# Patient Record
Sex: MALE | Race: WHITE | NOT HISPANIC OR LATINO
[De-identification: names, ages, dates, MRNs, and addresses within clinical notes are randomized per-mention and may not be internally consistent; named-entity substitution may affect disease eponyms.]

---

## 2018-12-04 PROBLEM — Z00.00 ENCOUNTER FOR PREVENTIVE HEALTH EXAMINATION: Status: ACTIVE | Noted: 2018-12-04

## 2019-02-04 ENCOUNTER — RECORD ABSTRACTING (OUTPATIENT)
Age: 64
End: 2019-02-04

## 2019-02-04 DIAGNOSIS — Z78.9 OTHER SPECIFIED HEALTH STATUS: ICD-10-CM

## 2019-02-04 DIAGNOSIS — G47.30 SLEEP APNEA, UNSPECIFIED: ICD-10-CM

## 2019-02-04 DIAGNOSIS — K21.9 GASTRO-ESOPHAGEAL REFLUX DISEASE W/OUT ESOPHAGITIS: ICD-10-CM

## 2019-02-06 ENCOUNTER — APPOINTMENT (OUTPATIENT)
Dept: INTERNAL MEDICINE | Facility: CLINIC | Age: 64
End: 2019-02-06
Payer: COMMERCIAL

## 2019-02-06 VITALS
HEART RATE: 72 BPM | OXYGEN SATURATION: 95 % | BODY MASS INDEX: 43.55 KG/M2 | DIASTOLIC BLOOD PRESSURE: 70 MMHG | HEIGHT: 66 IN | WEIGHT: 271 LBS | SYSTOLIC BLOOD PRESSURE: 130 MMHG | TEMPERATURE: 98 F

## 2019-02-06 PROCEDURE — 99204 OFFICE O/P NEW MOD 45 MIN: CPT

## 2019-02-06 NOTE — PHYSICAL EXAM
[No Acute Distress] : no acute distress [Well Developed] : well developed [Well-Appearing] : well-appearing [Normal Sclera/Conjunctiva] : normal sclera/conjunctiva [PERRL] : pupils equal round and reactive to light [EOMI] : extraocular movements intact [Normal Outer Ear/Nose] : the outer ears and nose were normal in appearance [No JVD] : no jugular venous distention [Supple] : supple [No Lymphadenopathy] : no lymphadenopathy [Thyroid Normal, No Nodules] : the thyroid was normal and there were no nodules present [No Respiratory Distress] : no respiratory distress  [Clear to Auscultation] : lungs were clear to auscultation bilaterally [No Accessory Muscle Use] : no accessory muscle use [Normal Rate] : normal rate  [Regular Rhythm] : with a regular rhythm [Normal S1, S2] : normal S1 and S2 [No Murmur] : no murmur heard [No Carotid Bruits] : no carotid bruits [No Abdominal Bruit] : a ~M bruit was not heard ~T in the abdomen [No Varicosities] : no varicosities [Pedal Pulses Present] : the pedal pulses are present [No Edema] : there was no peripheral edema [No Extremity Clubbing/Cyanosis] : no extremity clubbing/cyanosis [No Palpable Aorta] : no palpable aorta [Soft] : abdomen soft [Non Tender] : non-tender [Non-distended] : non-distended [No Masses] : no abdominal mass palpated [No HSM] : no HSM [Normal Bowel Sounds] : normal bowel sounds [Normal Posterior Cervical Nodes] : no posterior cervical lymphadenopathy [Normal Anterior Cervical Nodes] : no anterior cervical lymphadenopathy [No CVA Tenderness] : no CVA  tenderness [No Spinal Tenderness] : no spinal tenderness [No Joint Swelling] : no joint swelling [Grossly Normal Strength/Tone] : grossly normal strength/tone [No Rash] : no rash [Normal Gait] : normal gait [Coordination Grossly Intact] : coordination grossly intact [No Focal Deficits] : no focal deficits [Normal Affect] : the affect was normal [Normal Insight/Judgement] : insight and judgment were intact [de-identified] : morbid obesity [de-identified] : mallampati 3

## 2019-02-06 NOTE — PLAN
[FreeTextEntry1] : Arrange for an auto Pap unit. Patient is to return between one month and 3 months after starting therapy.

## 2019-02-06 NOTE — HISTORY OF PRESENT ILLNESS
[FreeTextEntry1] : Evaluation for sleep apnea. [de-identified] : This is a 64-year-old white male with a history of loud snoring and witnessed apneas. He recently saw his cardiologist and was referred for an outpatient sleep study. This reveals severe obstructive sleep apnea syndrome with a age high at 53.4 with severe O2 desaturations down to 61%. The patient works nights and sleeps mostly from 12 noon to 4 PM. He occasionally is able to take a nap at work from 4 AM to 6 AM. On the weekends he sleeps 8-9 hours per night. He denies excess daytime sleepiness with an [place] score of 5. 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 He is 5 feet 6 weight 271 pounds. There is a past history of of hyperlipidemia and borderline diabetes. The patient is unaware of any history of ASHD. His medications include Pravachol, Wellbutrin and baby aspirin. He is a nonsmoker. He occasionally has a drink.

## 2019-02-06 NOTE — ASSESSMENT
[FreeTextEntry1] : Patient with severe obstructive sleep apnea syndrome with an AHI of 53.4 with severe O2 desaturations. Will arrange for auto PAP. Patient is advised regarding the need to lose weight.

## 2019-02-13 ENCOUNTER — APPOINTMENT (OUTPATIENT)
Dept: CARDIOLOGY | Facility: CLINIC | Age: 64
End: 2019-02-13

## 2019-05-22 ENCOUNTER — APPOINTMENT (OUTPATIENT)
Dept: INTERNAL MEDICINE | Facility: CLINIC | Age: 64
End: 2019-05-22
Payer: COMMERCIAL

## 2019-05-22 VITALS
BODY MASS INDEX: 43.55 KG/M2 | OXYGEN SATURATION: 98 % | HEART RATE: 65 BPM | WEIGHT: 271 LBS | HEIGHT: 66 IN | SYSTOLIC BLOOD PRESSURE: 130 MMHG | DIASTOLIC BLOOD PRESSURE: 80 MMHG

## 2019-05-22 PROCEDURE — 99213 OFFICE O/P EST LOW 20 MIN: CPT

## 2019-05-22 NOTE — PHYSICAL EXAM
[No Acute Distress] : no acute distress [Well Developed] : well developed [Well-Appearing] : well-appearing [Normal Sclera/Conjunctiva] : normal sclera/conjunctiva [PERRL] : pupils equal round and reactive to light [EOMI] : extraocular movements intact [Normal Outer Ear/Nose] : the outer ears and nose were normal in appearance [No JVD] : no jugular venous distention [Supple] : supple [No Lymphadenopathy] : no lymphadenopathy [Thyroid Normal, No Nodules] : the thyroid was normal and there were no nodules present [No Respiratory Distress] : no respiratory distress  [Clear to Auscultation] : lungs were clear to auscultation bilaterally [No Accessory Muscle Use] : no accessory muscle use [Normal Rate] : normal rate  [Regular Rhythm] : with a regular rhythm [Normal S1, S2] : normal S1 and S2 [No Murmur] : no murmur heard [No Carotid Bruits] : no carotid bruits [No Abdominal Bruit] : a ~M bruit was not heard ~T in the abdomen [No Varicosities] : no varicosities [Pedal Pulses Present] : the pedal pulses are present [No Edema] : there was no peripheral edema [No Extremity Clubbing/Cyanosis] : no extremity clubbing/cyanosis [No Palpable Aorta] : no palpable aorta [Soft] : abdomen soft [Non Tender] : non-tender [Non-distended] : non-distended [No Masses] : no abdominal mass palpated [No HSM] : no HSM [Normal Bowel Sounds] : normal bowel sounds [Normal Posterior Cervical Nodes] : no posterior cervical lymphadenopathy [Normal Anterior Cervical Nodes] : no anterior cervical lymphadenopathy [No CVA Tenderness] : no CVA  tenderness [No Spinal Tenderness] : no spinal tenderness [No Joint Swelling] : no joint swelling [Grossly Normal Strength/Tone] : grossly normal strength/tone [No Rash] : no rash [Normal Gait] : normal gait [Coordination Grossly Intact] : coordination grossly intact [No Focal Deficits] : no focal deficits [Normal Affect] : the affect was normal [Normal Insight/Judgement] : insight and judgment were intact [de-identified] : morbid obesity [de-identified] : mallampati 3

## 2019-05-22 NOTE — ASSESSMENT
[FreeTextEntry1] : Patient is compliant and benefiting from the use of CPAP. Patient is to continue with CPAP. I have reviewed the need to lose weight with patient. Patient will return to see me in one year.

## 2019-05-22 NOTE — HISTORY OF PRESENT ILLNESS
[FreeTextEntry1] : Followup obstructive sleep apnea syndrome. [de-identified] : Patient has been on CPAP for about 6 weeks. He is using CPAP regularly. He works nights and generally sleeps from 12 PM to 5 PM. He is using nasal pillows. He uses a CPAP all day every day. He generally feels rested. He has no real issues with CPAP. His compliance report was reviewed dated 3-22-4-20. The AHI is 1.2. His average usage was 5 hours and 47 minutes per day. Patient has not lost weight.

## 2019-12-05 ENCOUNTER — RX RENEWAL (OUTPATIENT)
Age: 64
End: 2019-12-05

## 2019-12-06 ENCOUNTER — RX RENEWAL (OUTPATIENT)
Age: 64
End: 2019-12-06

## 2020-05-19 ENCOUNTER — APPOINTMENT (OUTPATIENT)
Dept: INTERNAL MEDICINE | Facility: CLINIC | Age: 65
End: 2020-05-19
Payer: COMMERCIAL

## 2020-05-19 VITALS
DIASTOLIC BLOOD PRESSURE: 90 MMHG | SYSTOLIC BLOOD PRESSURE: 160 MMHG | HEART RATE: 70 BPM | BODY MASS INDEX: 41.78 KG/M2 | WEIGHT: 260 LBS | HEIGHT: 66 IN | OXYGEN SATURATION: 98 %

## 2020-05-19 PROCEDURE — 99213 OFFICE O/P EST LOW 20 MIN: CPT

## 2020-05-19 NOTE — PHYSICAL EXAM
[No Acute Distress] : no acute distress [Well Developed] : well developed [Well Nourished] : well nourished [Well-Appearing] : well-appearing [Normal Sclera/Conjunctiva] : normal sclera/conjunctiva [EOMI] : extraocular movements intact [Normal Outer Ear/Nose] : the outer ears and nose were normal in appearance [PERRL] : pupils equal round and reactive to light [No JVD] : no jugular venous distention [No Lymphadenopathy] : no lymphadenopathy [Supple] : supple [Thyroid Normal, No Nodules] : the thyroid was normal and there were no nodules present [No Respiratory Distress] : no respiratory distress  [No Accessory Muscle Use] : no accessory muscle use [Normal Rate] : normal rate  [Clear to Auscultation] : lungs were clear to auscultation bilaterally [Regular Rhythm] : with a regular rhythm [Normal S1, S2] : normal S1 and S2 [No Murmur] : no murmur heard [No Carotid Bruits] : no carotid bruits [No Varicosities] : no varicosities [No Abdominal Bruit] : a ~M bruit was not heard ~T in the abdomen [No Edema] : there was no peripheral edema [Pedal Pulses Present] : the pedal pulses are present [No Palpable Aorta] : no palpable aorta [Soft] : abdomen soft [No Extremity Clubbing/Cyanosis] : no extremity clubbing/cyanosis [Non Tender] : non-tender [Non-distended] : non-distended [No Masses] : no abdominal mass palpated [No HSM] : no HSM [Normal Bowel Sounds] : normal bowel sounds [Normal Posterior Cervical Nodes] : no posterior cervical lymphadenopathy [Normal Anterior Cervical Nodes] : no anterior cervical lymphadenopathy [No Joint Swelling] : no joint swelling [No CVA Tenderness] : no CVA  tenderness [No Spinal Tenderness] : no spinal tenderness [Grossly Normal Strength/Tone] : grossly normal strength/tone [No Rash] : no rash [No Focal Deficits] : no focal deficits [Coordination Grossly Intact] : coordination grossly intact [Normal Affect] : the affect was normal [Normal Gait] : normal gait [Normal Insight/Judgement] : insight and judgment were intact [de-identified] : mallampati 3

## 2020-05-19 NOTE — ASSESSMENT
[FreeTextEntry1] : Patient with severe sleep apnea. Patient is compliant and benefiting from the use of CPAP. However he'll only sleeps for about 3-1/2 hours during the day when he is working. He is advised to try to sleep a lot during the day if possible given his work schedule. He will follow up with me in one year.

## 2020-05-19 NOTE — HISTORY OF PRESENT ILLNESS
[FreeTextEntry1] : One-year followup for sleep apnea. [de-identified] : Patient states he works during the night about 5:30 PM to 10 AM. He will nap for 2 hours during the night from 4 to 6 AM. When he goes home he will sleep from noon to 3:30. He states he uses his CPAP every day when he does sleep. He sleeps well with CPAP. He uses CPAP for 8 hours on the weekends. He has not lost weight and his weight amino acid 260. He denies other chronic complaints. He states he generally feels rested and is not excessively sleepy. His CPAP compliance report was reviewed. He uses CPAP and average of 3 hours and 53 minutes with an AHI of 1.3.

## 2020-06-23 ENCOUNTER — APPOINTMENT (OUTPATIENT)
Dept: CARDIOLOGY | Facility: CLINIC | Age: 65
End: 2020-06-23
Payer: COMMERCIAL

## 2020-06-23 ENCOUNTER — NON-APPOINTMENT (OUTPATIENT)
Age: 65
End: 2020-06-23

## 2020-06-23 VITALS — SYSTOLIC BLOOD PRESSURE: 125 MMHG | DIASTOLIC BLOOD PRESSURE: 65 MMHG | HEART RATE: 68 BPM | HEIGHT: 66 IN

## 2020-06-23 DIAGNOSIS — R94.39 ABNORMAL RESULT OF OTHER CARDIOVASCULAR FUNCTION STUDY: ICD-10-CM

## 2020-06-23 DIAGNOSIS — Z82.49 FAMILY HISTORY OF ISCHEMIC HEART DISEASE AND OTHER DISEASES OF THE CIRCULATORY SYSTEM: ICD-10-CM

## 2020-06-23 DIAGNOSIS — Z87.898 PERSONAL HISTORY OF OTHER SPECIFIED CONDITIONS: ICD-10-CM

## 2020-06-23 PROCEDURE — 93000 ELECTROCARDIOGRAM COMPLETE: CPT

## 2020-06-23 PROCEDURE — 99213 OFFICE O/P EST LOW 20 MIN: CPT

## 2020-06-23 NOTE — HISTORY OF PRESENT ILLNESS
[FreeTextEntry1] : Patient has made good progress since his last visit he has lost about  20 pounds by following an improved diet. He does walking exercise. There has been no chest discomfort, unusual shortness of breath or palpitations.

## 2020-06-23 NOTE — REASON FOR VISIT
[FreeTextEntry1] : The patient is followed with a diagnosis of nonobstructive coronary artery disease at catheterization in 2014, obesity, sleep apnea, hyperlipidemia.

## 2020-06-23 NOTE — PHYSICAL EXAM
[General Appearance - Well Developed] : well developed [Normal Appearance] : normal appearance [Well Groomed] : well groomed [General Appearance - Well Nourished] : well nourished [No Deformities] : no deformities [General Appearance - In No Acute Distress] : no acute distress [Normal Oral Mucosa] : normal oral mucosa [Eyelids - No Xanthelasma] : the eyelids demonstrated no xanthelasmas [Normal Conjunctiva] : the conjunctiva exhibited no abnormalities [No Oral Pallor] : no oral pallor [Normal Jugular Venous A Waves Present] : normal jugular venous A waves present [No Oral Cyanosis] : no oral cyanosis [Normal Jugular Venous V Waves Present] : normal jugular venous V waves present [No Jugular Venous Avelar A Waves] : no jugular venous avelar A waves [Auscultation Breath Sounds / Voice Sounds] : lungs were clear to auscultation bilaterally [Exaggerated Use Of Accessory Muscles For Inspiration] : no accessory muscle use [Respiration, Rhythm And Depth] : normal respiratory rhythm and effort [Heart Rate And Rhythm] : heart rate and rhythm were normal [Heart Sounds] : normal S1 and S2 [Murmurs] : no murmurs present [Abdomen Soft] : soft [Abdomen Tenderness] : non-tender [Abdomen Mass (___ Cm)] : no abdominal mass palpated [Abnormal Walk] : normal gait [Gait - Sufficient For Exercise Testing] : the gait was sufficient for exercise testing [Nail Clubbing] : no clubbing of the fingernails [Cyanosis, Localized] : no localized cyanosis [Petechial Hemorrhages (___cm)] : no petechial hemorrhages [Skin Color & Pigmentation] : normal skin color and pigmentation [] : no rash [No Venous Stasis] : no venous stasis [Skin Lesions] : no skin lesions [No Xanthoma] : no  xanthoma was observed [No Skin Ulcers] : no skin ulcer [Oriented To Time, Place, And Person] : oriented to person, place, and time [Affect] : the affect was normal [Mood] : the mood was normal [No Anxiety] : not feeling anxious

## 2020-06-23 NOTE — DISCUSSION/SUMMARY
[FreeTextEntry1] : Please with the patient's significant weight reduction over the past several months by following a better diet. He has been tried on various statins but probably statin is the only one that he has been able to tolerate this will be continued. Today's blood pressure was 125/65 the electrocardiogram reveals no significant change from previous. I am encouraging the patient to continue his program of weight reduction. I plan to do treadmill testing in 6 months.

## 2020-11-18 ENCOUNTER — RX RENEWAL (OUTPATIENT)
Age: 65
End: 2020-11-18

## 2020-12-08 ENCOUNTER — APPOINTMENT (OUTPATIENT)
Dept: CARDIOLOGY | Facility: CLINIC | Age: 65
End: 2020-12-08
Payer: COMMERCIAL

## 2020-12-08 VITALS
SYSTOLIC BLOOD PRESSURE: 140 MMHG | DIASTOLIC BLOOD PRESSURE: 75 MMHG | WEIGHT: 277 LBS | BODY MASS INDEX: 44.52 KG/M2 | HEIGHT: 66 IN | HEART RATE: 71 BPM

## 2020-12-08 PROCEDURE — 99072 ADDL SUPL MATRL&STAF TM PHE: CPT

## 2020-12-08 PROCEDURE — 93015 CV STRESS TEST SUPVJ I&R: CPT

## 2020-12-08 PROCEDURE — 99214 OFFICE O/P EST MOD 30 MIN: CPT | Mod: 25

## 2020-12-08 NOTE — DISCUSSION/SUMMARY
[FreeTextEntry1] : The patient's examination is significant for very severe obesity. Unfortunately there has been a 17 pound weight gain from May of this year.The patient has had no acute cardiac symptoms. His stress test reveals diffuse ST segment depression during exercise.the findings are similar to a stress test that I had performed in September 2018 after which I sent the patient for coronary angiography which revealed only nonobstructive coronary disease. I believe it is necessary to reevaluate the coronary status with another nuclear stress test at the present time. Going forward I am again recommending exercise weight reduction and dietary modifications etc. to try to limit the risk ofsubsequent coronary event.The patient is able to tolerate PRAVAstatin; he was not able to tolerate any of the other statins and therefore this will be continued along with his other medications. I am electing to losartan 25 mg to his therapeutic regimen for blood pressure control and vascular protection. The patient's labs are done by his primary care physician and I am asking the patient to request that they be sent.

## 2020-12-08 NOTE — REASON FOR VISIT
[FreeTextEntry1] : Patient comes for followup and stress testing. There is a history of severe obesity, sleep apnea, and hyperlipidemia. Patient has had no symptoms of chest pain or discomfort. He remains active he works regularly and also states that he does walking exercise without difficulty.

## 2020-12-08 NOTE — PHYSICAL EXAM

## 2021-03-16 ENCOUNTER — NON-APPOINTMENT (OUTPATIENT)
Age: 66
End: 2021-03-16

## 2021-03-16 ENCOUNTER — APPOINTMENT (OUTPATIENT)
Dept: CARDIOLOGY | Facility: CLINIC | Age: 66
End: 2021-03-16
Payer: COMMERCIAL

## 2021-03-16 VITALS
BODY MASS INDEX: 45.16 KG/M2 | WEIGHT: 281 LBS | HEART RATE: 75 BPM | TEMPERATURE: 98.6 F | DIASTOLIC BLOOD PRESSURE: 70 MMHG | HEIGHT: 66 IN | SYSTOLIC BLOOD PRESSURE: 148 MMHG | OXYGEN SATURATION: 99 %

## 2021-03-16 PROCEDURE — 99214 OFFICE O/P EST MOD 30 MIN: CPT

## 2021-03-16 PROCEDURE — 93000 ELECTROCARDIOGRAM COMPLETE: CPT

## 2021-03-16 PROCEDURE — 99072 ADDL SUPL MATRL&STAF TM PHE: CPT

## 2021-03-17 NOTE — HISTORY OF PRESENT ILLNESS
[FreeTextEntry1] : The patient has had no symptoms of angina, chest pain, palpitations or unusual shortness of breath the

## 2021-03-17 NOTE — REASON FOR VISIT
[FreeTextEntry1] : The patient is followed with a diagnosis of coronary artery disease, abnormal stress test, obesity, hyperlipidemia, hypertension. Several years ago I referred him for coronary angiography after an abnormal stress test this revealed nonobstructive coronary disease. More recently another stress test was abnormal with diffuse ST depression and I recommended a nuclear stress test however this was denied by his insurance company. It should be noted that the patient has not experienced chest pain or discomfort even during the stress test itself. Unfortunately weight gain persists: the patient has gained over 20 pounds in the last year. Regular exercise is limited. The patient is tolerating his medications without difficulty. He has been able to tolerate pravastatin but was not able to tolerate other statins. Also losartan was added to his therapeutic regimen.

## 2021-03-17 NOTE — PHYSICAL EXAM

## 2021-03-17 NOTE — DISCUSSION/SUMMARY
[FreeTextEntry1] : The patient requires intensive risk factor modification in order to prevent subsequent coronary events. I have explained to him that the nonobstructive disease present on angiography  in 2018 could develop into a clinical issue should there be plaque disruption. He has an extremely stressful work schedule requiring very long hours etc. I have asked him to try to modify this is if  at all possible. Very concerned about the patient's weight. I am urging him to adopt a low carbohydrate diet for weight reduction. I believe the best way of avoiding a subsequent coronary event will be lifestyle modification rather than multiple additional tests. We'll continue to follow the patient on a regular basis. He is also followed by his primary physician Dr. Escalera.\par The patient was given a prescription for repeat labs including a lipid profile. He might be a candidate for Zetia in addition to Pravachol. He is working

## 2021-04-19 ENCOUNTER — NON-APPOINTMENT (OUTPATIENT)
Age: 66
End: 2021-04-19

## 2021-04-22 RX ORDER — ALIROCUMAB 75 MG/ML
75 INJECTION, SOLUTION SUBCUTANEOUS
Qty: 6 | Refills: 3 | Status: DISCONTINUED | COMMUNITY
Start: 2021-04-20 | End: 2021-04-22

## 2021-05-18 ENCOUNTER — APPOINTMENT (OUTPATIENT)
Dept: FAMILY MEDICINE | Facility: CLINIC | Age: 66
End: 2021-05-18

## 2021-05-18 ENCOUNTER — APPOINTMENT (OUTPATIENT)
Dept: INTERNAL MEDICINE | Facility: CLINIC | Age: 66
End: 2021-05-18

## 2021-05-18 VITALS
HEART RATE: 76 BPM | DIASTOLIC BLOOD PRESSURE: 80 MMHG | WEIGHT: 287 LBS | SYSTOLIC BLOOD PRESSURE: 150 MMHG | OXYGEN SATURATION: 98 % | HEIGHT: 66 IN | BODY MASS INDEX: 46.12 KG/M2

## 2021-05-18 NOTE — HEALTH RISK ASSESSMENT
[Good] : ~his/her~  mood as  good [Yes] : Yes [2 - 4 times a month (2 pts)] : 2-4 times a month (2 points) [1 or 2 (0 pts)] : 1 or 2 (0 points) [Never (0 pts)] : Never (0 points) [No] : In the past 12 months have you used drugs other than those required for medical reasons? No [No falls in past year] : Patient reported no falls in the past year [0] : 2) Feeling down, depressed, or hopeless: Not at all (0) [Patient reported colonoscopy was abnormal] : Patient reported colonoscopy was abnormal [HIV test declined] : HIV test declined [Hepatitis C test declined] : Hepatitis C test declined [None] : None [With Significant Other] : lives with significant other [Employed] : employed [] :  [# Of Children ___] : has [unfilled] children [Feels Safe at Home] : Feels safe at home [Fully functional (bathing, dressing, toileting, transferring, walking, feeding)] : Fully functional (bathing, dressing, toileting, transferring, walking, feeding) [Fully functional (using the telephone, shopping, preparing meals, housekeeping, doing laundry, using] : Fully functional and needs no help or supervision to perform IADLs (using the telephone, shopping, preparing meals, housekeeping, doing laundry, using transportation, managing medications and managing finances) [Reports normal functional visual acuity (ie: able to read med bottle)] : Reports normal functional visual acuity [Smoke Detector] : smoke detector [Carbon Monoxide Detector] : carbon monoxide detector [Safety elements used in home] : safety elements used in home [Seat Belt] :  uses seat belt [Sunscreen] : uses sunscreen [] : No [de-identified] : none [de-identified] : normal [Change in mental status noted] : No change in mental status noted [Language] : denies difficulty with language [Behavior] : denies difficulty with behavior [Learning/Retaining New Information] : denies difficulty learning/retaining new information [Handling Complex Tasks] : denies difficulty handling complex tasks [Reasoning] : denies difficulty with reasoning [Spatial Ability and Orientation] : denies difficulty with spatial ability and orientation [Reports changes in hearing] : Reports no changes in hearing [Reports changes in vision] : Reports no changes in vision [Reports changes in dental health] : Reports no changes in dental health [Guns at Home] : no guns at home [Travel to Developing Areas] : does not  travel to developing areas [TB Exposure] : is not being exposed to tuberculosis [Caregiver Concerns] : does not have caregiver concerns [ColonoscopyDate] : 01/15 [ColonoscopyComments] : polyp

## 2021-06-15 ENCOUNTER — APPOINTMENT (OUTPATIENT)
Dept: PULMONOLOGY | Facility: HOSPITAL | Age: 66
End: 2021-06-15
Payer: COMMERCIAL

## 2021-06-15 VITALS
BODY MASS INDEX: 46.12 KG/M2 | HEART RATE: 76 BPM | SYSTOLIC BLOOD PRESSURE: 150 MMHG | HEIGHT: 66 IN | WEIGHT: 287 LBS | DIASTOLIC BLOOD PRESSURE: 80 MMHG

## 2021-06-15 PROCEDURE — 99203 OFFICE O/P NEW LOW 30 MIN: CPT

## 2021-06-15 RX ORDER — EZETIMIBE 10 MG/1
10 TABLET ORAL DAILY
Qty: 30 | Refills: 5 | Status: DISCONTINUED | COMMUNITY
Start: 2021-04-22 | End: 2021-06-15

## 2021-06-15 RX ORDER — FUROSEMIDE 80 MG/1
TABLET ORAL
Refills: 0 | Status: ACTIVE | COMMUNITY

## 2021-06-15 NOTE — HISTORY OF PRESENT ILLNESS
[FreeTextEntry1] : Dr. Escalera\par Dr. Gil\par 66 year old man  with history of pineda, cad, htn, hld is here in the sleep center to address  sleep apnea. Patient has severe apnea in 2019 and is on cpap since.\par download data\par ahi 1\par usage 5 hrs\par pressure 10 cm\par He is compliant and benefitted with cpap. He is not sleepy while driving.\par dme apria\par uses nasal pillows

## 2021-06-15 NOTE — ASSESSMENT
[FreeTextEntry1] : 66 year  old man  with sleep apnea is doing well with the CPAP.  Patient is compliant with the CPAP and benefited significantly with the CPAP.\par Patient has bilateral leg swelling so it could be possible that he is getting the swelling is due to his weight and being on the feet all day.  I do not see an echocardiogram in the chart, he will discuss with the cardiologist next week when he sees him to talk about possibility of a echocardiogram to see if there is any pulmonary hypertension or other etiologies for the leg swellings.

## 2021-06-15 NOTE — PHYSICAL EXAM
[General Appearance - Well Developed] : well developed [General Appearance - Well Nourished] : well nourished [IV] : IV [Heart Sounds] : normal S1 and S2 [Murmurs] : no murmurs [] : no respiratory distress [Auscultation Breath Sounds / Voice Sounds] : lungs were clear to auscultation bilaterally [No Focal Deficits] : no focal deficits [Oriented To Time, Place, And Person] : oriented to person, place, and time [Memory Recent] : recent memory was not impaired [FreeTextEntry1] : edema pitting

## 2021-07-20 ENCOUNTER — NON-APPOINTMENT (OUTPATIENT)
Age: 66
End: 2021-07-20

## 2021-07-20 ENCOUNTER — APPOINTMENT (OUTPATIENT)
Dept: CARDIOLOGY | Facility: CLINIC | Age: 66
End: 2021-07-20
Payer: COMMERCIAL

## 2021-07-20 VITALS
WEIGHT: 288 LBS | BODY MASS INDEX: 46.28 KG/M2 | DIASTOLIC BLOOD PRESSURE: 70 MMHG | TEMPERATURE: 98.7 F | HEART RATE: 70 BPM | HEIGHT: 66 IN | SYSTOLIC BLOOD PRESSURE: 120 MMHG

## 2021-07-20 PROCEDURE — 93000 ELECTROCARDIOGRAM COMPLETE: CPT

## 2021-07-20 PROCEDURE — 99214 OFFICE O/P EST MOD 30 MIN: CPT

## 2021-07-20 PROCEDURE — 99072 ADDL SUPL MATRL&STAF TM PHE: CPT

## 2021-07-20 NOTE — REASON FOR VISIT
[FreeTextEntry1] : The patient comes for followup today. I was concerned about a recent stress test which revealed significant ST segment depression but nochest pain.Ireferred the patient for nuclear stress test which was denied by his insurance company.Since his last visit the patient has not experienced chest pain or anginal symptoms. He remains in a stressful job situation with a disrupted sleep cycle. The patient is treated for sleep apnea with a CPAP machine which was recently adjusted by a specialist and not disorder.\france Bhagat weeks ago the patient noted discomfort in the lower extremities consisting of intermittent sharp pain down the posterior aspect of the thighs and calves. This has occurred sporadically. He thought it might be related to his mind that had recently been started on this medication was discontinued with no change in his symptoms.

## 2021-07-20 NOTE — REVIEW OF SYSTEMS
[Negative] : Heme/Lymph [FreeTextEntry9] : posterior lower extremity pain bilaterallysometimes aggravated by walking.

## 2021-07-20 NOTE — DISCUSSION/SUMMARY
[FreeTextEntry1] : I believe the patient's lower extremity symptoms are most likely not related to medication but rather are suggestive of spinal stenosis. The patient's examination reveals normal pedal pulses bilaterally arguing against a vascular etiology. The patient is asymptomatic regarding symptoms of angina or exertional chest discomfort. A previous coronary angiography revealed nonobstructive coronary artery disease. It is imperative that we practice of very aggressive preventive measures in this case. Consequently I am going to recommend a PCS canine inhibitor to try to lower the LDL cholesterol. He will continue on pravastatin which is the only statin that he has been able to tolerate. I have asked the patient to contact his primary care physician to evaluate the lower extremity symptoms possibly the patient will require an imaging study of the spine to evaluate the possibility of spinal stenosis. His blood pressure is now under better control with the addition of losartan to his regimen.

## 2021-07-30 ENCOUNTER — NON-APPOINTMENT (OUTPATIENT)
Age: 66
End: 2021-07-30

## 2021-11-16 ENCOUNTER — APPOINTMENT (OUTPATIENT)
Dept: CARDIOLOGY | Facility: CLINIC | Age: 66
End: 2021-11-16
Payer: COMMERCIAL

## 2021-11-16 ENCOUNTER — NON-APPOINTMENT (OUTPATIENT)
Age: 66
End: 2021-11-16

## 2021-11-16 VITALS
HEIGHT: 66 IN | OXYGEN SATURATION: 99 % | DIASTOLIC BLOOD PRESSURE: 85 MMHG | SYSTOLIC BLOOD PRESSURE: 150 MMHG | BODY MASS INDEX: 43.71 KG/M2 | HEART RATE: 73 BPM | WEIGHT: 272 LBS

## 2021-11-16 PROCEDURE — 99213 OFFICE O/P EST LOW 20 MIN: CPT

## 2021-11-16 PROCEDURE — 93000 ELECTROCARDIOGRAM COMPLETE: CPT

## 2021-11-19 ENCOUNTER — RX RENEWAL (OUTPATIENT)
Age: 66
End: 2021-11-19

## 2021-11-19 NOTE — DISCUSSION/SUMMARY
[FreeTextEntry1] : I am doing my utmost to practice and potential preventive cardiology in this case. I believe the patient is at a higher risk of a cardiac event and therefore I recommend very aggressive preventive measures. For example I prescribed a PCSk9  inhibitor and the patient was approved for this however he did not pursue the medication or start to treatment.The  patient has lost weight from the last visit which is commendable.currently he is undergoing an evaluation for a very painful lower extremity which may be of a sciatic nature. An MRI is scheduled on the results have been requested. Fortunately there has been no evidence of active myocardial ischemia and disorder 4 obesity the patient's exam is unremarkable the blood pressure was slightly elevated at 150/85electrocardiogram reveals sinus rhythm T-wave abnormalities aVL but no acute changes are noted. There is no clinical evidence of active myocardial ischemia.\par I have urged the patient to take  the covid vaccine but in spite of my efforts to persuade him he has not been willing to do this.I consider the patient I high  risk if he should contract covid.\par the current medications will be continued.\par We will continue to follow patient's progress on a regular basis. I have urged him to let me know if there are any changes in his clinical condition.

## 2021-11-19 NOTE — REVIEW OF SYSTEMS
[FreeTextEntry4] : the patient is also undergoing an evaluation for a hoarse voice with an ENT physician.

## 2021-11-19 NOTE — REASON FOR VISIT
[FreeTextEntry1] : The patient is followed with a diagnosis of coronary artery disease. Previous coronary angiography revealed nonobstructive disease. However the patient had a positive regular stress test. I had requested and ordered a nuclear stress test however this was denied by his insurance company. The patient has had no anginal symptoms. I also wanted him to go on a PCSK-9  inhibitor to lower his LDL cholesterol however this was not done.The patient had a great deal of difficulty with several statins he is tolerating pravastatin and a relatively low dose.He was not able to tolerate a higher dose.\par The patient is presently undergoing an evaluation lower extremity pain involving thigh and calf muscles. He is scheduled for an MRI of the low back and I am requesting these results.

## 2021-12-17 ENCOUNTER — RX RENEWAL (OUTPATIENT)
Age: 66
End: 2021-12-17

## 2022-03-14 ENCOUNTER — RX RENEWAL (OUTPATIENT)
Age: 67
End: 2022-03-14

## 2022-05-10 ENCOUNTER — NON-APPOINTMENT (OUTPATIENT)
Age: 67
End: 2022-05-10

## 2022-05-10 ENCOUNTER — APPOINTMENT (OUTPATIENT)
Dept: CARDIOLOGY | Facility: CLINIC | Age: 67
End: 2022-05-10
Payer: COMMERCIAL

## 2022-05-10 VITALS
OXYGEN SATURATION: 98 % | HEIGHT: 66 IN | HEART RATE: 77 BPM | SYSTOLIC BLOOD PRESSURE: 120 MMHG | DIASTOLIC BLOOD PRESSURE: 80 MMHG | WEIGHT: 290 LBS | BODY MASS INDEX: 46.61 KG/M2

## 2022-05-10 PROCEDURE — 99214 OFFICE O/P EST MOD 30 MIN: CPT

## 2022-05-10 PROCEDURE — 93000 ELECTROCARDIOGRAM COMPLETE: CPT

## 2022-05-10 RX ORDER — ALIROCUMAB 75 MG/ML
75 INJECTION, SOLUTION SUBCUTANEOUS
Qty: 1 | Refills: 5 | Status: DISCONTINUED | COMMUNITY
Start: 2021-08-11 | End: 2022-05-10

## 2022-05-10 RX ORDER — SILDENAFIL 20 MG/1
20 TABLET ORAL DAILY
Refills: 0 | Status: DISCONTINUED | COMMUNITY
End: 2022-05-10

## 2022-05-10 NOTE — REASON FOR VISIT
[FreeTextEntry1] : Patient returns for follow-up today.  He is followed with a diagnosis of coronary artery disease manifesting as abnormal stress test.  Cardiac angiography revealed nonobstructive coronary artery disease.  The patient's cardiac status has remained quite stable.  He denies symptoms of angina chest discomfort unusual shortness of breath.  However the patient's activity and exercise is limited lately he has experienced symptoms of spinal stenosis.  The patient has had difficulty with statins but is currently able to tolerate pravastatin and a low dose.  We discussed PCSK9 inhibitors however the patient was reluctant to administer injections for himself.  Patient has received physical therapy for the spinal stenosis.  Weight reduction has been very difficult.

## 2022-05-10 NOTE — DISCUSSION/SUMMARY
[FreeTextEntry1] : Fortunately the patient has had no acute cardiac symptoms.  However there has been substantial weight gain from the last visit.  This remains a major clinical issue for him.  He has had very limited exercise.  He is diagnosed with spinal stenosis but this would not preclude walking exercise.  The patient is being treated by a ENT physician for GE reflux with pantoprazole.  We have been trying to offer the patient all preventive methods.  He will continue on statin therapy.  I would I would consider semaglutide therapy for mild hyperglycemia in order to facilitate weight reduction.  Also the patient might be a candidate for metformin.  I plan to do another treadmill with echocardiography at the time of his next visit. \par \par Very concerned about the patient's weight gain and lack of exercise.  I believe his coronary disease is more significant than that which appeared on his angiogram.\par Am again urging him to maintain a program of diet and walking exercise.

## 2022-05-10 NOTE — PHYSICAL EXAM
[Well Developed] : well developed [Well Nourished] : well nourished [No Acute Distress] : no acute distress [Obese] : obese [Normal Conjunctiva] : normal conjunctiva [Normal Venous Pressure] : normal venous pressure [No Carotid Bruit] : no carotid bruit [Normal S1, S2] : normal S1, S2 [No Murmur] : no murmur [No Rub] : no rub [No Gallop] : no gallop [Clear Lung Fields] : clear lung fields [Good Air Entry] : good air entry [No Respiratory Distress] : no respiratory distress  [Soft] : abdomen soft [Non Tender] : non-tender [No Masses/organomegaly] : no masses/organomegaly [Normal Bowel Sounds] : normal bowel sounds [Normal Gait] : normal gait [No Cyanosis] : no cyanosis [No Clubbing] : no clubbing [No Varicosities] : no varicosities [No Rash] : no rash [No Skin Lesions] : no skin lesions [Moves all extremities] : moves all extremities [No Focal Deficits] : no focal deficits [Normal Speech] : normal speech [Alert and Oriented] : alert and oriented [Normal memory] : normal memory [de-identified] : SEVEE OBESITY. [de-identified] : MILD ANKLE EDEMA.

## 2022-08-02 ENCOUNTER — APPOINTMENT (OUTPATIENT)
Dept: CARDIOLOGY | Facility: CLINIC | Age: 67
End: 2022-08-02

## 2022-08-02 PROCEDURE — 99213 OFFICE O/P EST LOW 20 MIN: CPT | Mod: 25

## 2022-08-02 PROCEDURE — 93351 STRESS TTE COMPLETE: CPT

## 2022-08-02 RX ORDER — BUPROPION HYDROCHLORIDE 150 MG/1
150 TABLET, EXTENDED RELEASE ORAL
Qty: 30 | Refills: 0 | Status: ACTIVE | COMMUNITY
Start: 2022-06-18

## 2022-08-02 RX ORDER — PANTOPRAZOLE 40 MG/1
40 TABLET, DELAYED RELEASE ORAL
Qty: 90 | Refills: 0 | Status: ACTIVE | COMMUNITY
Start: 2021-11-17

## 2022-08-02 NOTE — REASON FOR VISIT
[FreeTextEntry1] : Patient returns today for follow-up including stress echocardiography.  There have been several coronary risk factors including hypertension previous history of sleep apnea and overweight.  Patient is also treated for hyperlipidemia.  He had a great deal of difficulty with various statins but is now tolerating pravastatin.\par He is focusing on a low carbohydrate diet and this has enabled him to lose about 15 pounds since the last visit to the office.

## 2022-08-02 NOTE — DISCUSSION/SUMMARY
[FreeTextEntry1] : The patient's clinical condition fortunately remains stable.  There have been no acute cardiac symptoms.  The cardiorespiratory examination is unremarkable.  The patient went stress echocardiography today.  The baseline EKG reveals nonspecific ST-T wave abnormalities.  However there was no clinical or echocardiographic evidence of exercise-induced myocardial ischemia the left ventricular function was normal at rest with an estimated ejection fraction of 65% and there was augmented contractility post exercise with an estimated EF of 70%.\par Based on today's visit I believe the patient is making very good clinical progress.  I applauded his weight loss and I am urging him to continue on his program of dietary modification and regular walking exercise.

## 2022-09-12 ENCOUNTER — RX RENEWAL (OUTPATIENT)
Age: 67
End: 2022-09-12

## 2022-11-14 ENCOUNTER — RX RENEWAL (OUTPATIENT)
Age: 67
End: 2022-11-14

## 2022-12-16 ENCOUNTER — RX RENEWAL (OUTPATIENT)
Age: 67
End: 2022-12-16

## 2023-03-16 ENCOUNTER — APPOINTMENT (OUTPATIENT)
Dept: PULMONOLOGY | Facility: CLINIC | Age: 68
End: 2023-03-16
Payer: COMMERCIAL

## 2023-03-16 ENCOUNTER — RX RENEWAL (OUTPATIENT)
Age: 68
End: 2023-03-16

## 2023-03-16 VITALS
WEIGHT: 290 LBS | HEART RATE: 75 BPM | BODY MASS INDEX: 46.61 KG/M2 | DIASTOLIC BLOOD PRESSURE: 80 MMHG | SYSTOLIC BLOOD PRESSURE: 150 MMHG | HEIGHT: 66 IN

## 2023-03-16 PROCEDURE — 99213 OFFICE O/P EST LOW 20 MIN: CPT

## 2023-03-16 RX ORDER — LOSARTAN POTASSIUM 25 MG/1
25 TABLET, FILM COATED ORAL
Qty: 90 | Refills: 3 | Status: ACTIVE | COMMUNITY
Start: 2020-12-08 | End: 1900-01-01

## 2023-03-16 RX ORDER — OMEPRAZOLE 40 MG/1
40 CAPSULE, DELAYED RELEASE ORAL
Refills: 0 | Status: DISCONTINUED | COMMUNITY
End: 2023-03-16

## 2023-03-16 NOTE — PHYSICAL EXAM
[General Appearance - Well Developed] : well developed [General Appearance - Well Nourished] : well nourished [IV] : IV [Heart Sounds] : normal S1 and S2 [Murmurs] : no murmurs [] : no respiratory distress [Auscultation Breath Sounds / Voice Sounds] : lungs were clear to auscultation bilaterally [No Focal Deficits] : no focal deficits [Oriented To Time, Place, And Person] : oriented to person, place, and time [Memory Recent] : recent memory was not impaired [FreeTextEntry1] : edema pitting bilateral

## 2023-03-16 NOTE — HISTORY OF PRESENT ILLNESS
[FreeTextEntry1] : Dr. Escalera\par Dr. Gil\par 68 year old man  with history of pineda, cad, htn, hld is here in the sleep center to address  sleep apnea. Patient has severe apnea in 2019 and is on cpap since.\par download data\par ahi 1.1\par usage 5 hrs\par pressure 10 cm\par He is compliant and benefitted with cpap. He is not sleepy while driving.\par dme [employer]\par uses nasal pillows\par \par He works night shift and sleeps only for 4 hrs at home a day.

## 2023-03-16 NOTE — ASSESSMENT
[FreeTextEntry1] : 66 year  old man  with sleep apnea is doing well with the CPAP.  Patient is compliant with the CPAP and benefited significantly with the CPAP.\par Patient has bilateral leg swelling - echo shows normal ejection fraction.

## 2023-04-11 ENCOUNTER — APPOINTMENT (OUTPATIENT)
Dept: CARDIOLOGY | Facility: CLINIC | Age: 68
End: 2023-04-11
Payer: COMMERCIAL

## 2023-04-11 ENCOUNTER — NON-APPOINTMENT (OUTPATIENT)
Age: 68
End: 2023-04-11

## 2023-04-11 VITALS
WEIGHT: 283.25 LBS | HEART RATE: 74 BPM | RESPIRATION RATE: 18 BRPM | SYSTOLIC BLOOD PRESSURE: 136 MMHG | HEIGHT: 66 IN | DIASTOLIC BLOOD PRESSURE: 68 MMHG | BODY MASS INDEX: 45.52 KG/M2 | OXYGEN SATURATION: 96 % | TEMPERATURE: 97.6 F

## 2023-04-11 DIAGNOSIS — Z86.39 PERSONAL HISTORY OF OTHER ENDOCRINE, NUTRITIONAL AND METABOLIC DISEASE: ICD-10-CM

## 2023-04-11 DIAGNOSIS — F32.A DEPRESSION, UNSPECIFIED: ICD-10-CM

## 2023-04-11 DIAGNOSIS — I10 ESSENTIAL (PRIMARY) HYPERTENSION: ICD-10-CM

## 2023-04-11 DIAGNOSIS — I25.119 ATHEROSCLEROTIC HEART DISEASE OF NATIVE CORONARY ARTERY WITH UNSPECIFIED ANGINA PECTORIS: ICD-10-CM

## 2023-04-11 PROCEDURE — 99214 OFFICE O/P EST MOD 30 MIN: CPT | Mod: 25

## 2023-04-11 PROCEDURE — 93000 ELECTROCARDIOGRAM COMPLETE: CPT

## 2023-04-11 RX ORDER — MELOXICAM 15 MG/1
TABLET ORAL
Refills: 0 | Status: ACTIVE | COMMUNITY

## 2023-04-11 NOTE — CARDIOLOGY SUMMARY
[de-identified] : The echo stress echocardiography August 2022 baseline EKG nonspecific ST-T wave abnormalities no clinical or echocardiographic evidence of exercise-induced myocardial ischemia LV function 65% at rest 70% post exercise. [de-identified] : Recent labs from primary care glucose 123 creatinine 1.27 cholesterol 187 HDL 47

## 2023-04-11 NOTE — DISCUSSION/SUMMARY
[FreeTextEntry1] : Believe the patient continues to make very good clinical progress especially with regarding diet and weight reduction.  I am hopeful that this can be continued and that we see even more meaningful low loss of weight\par \par There have been no acute cardiac symptoms\par \par He is tolerating pravastatin well and this will be continued.\par \par I am electing to continue his present medications\par \par The electrocardiographic today reveals sinus rhythm and slightly inverted T waves 1 aVL no other significant changes are noted.

## 2023-04-11 NOTE — REVIEW OF SYSTEMS
[Negative] : Heme/Lymph [FreeTextEntry6] : Patient is treated for sleep apnea with a CPAP machine. [FreeTextEntry8] : Patient reports erectile dysfunction I believe he would be a suitable candidate for phosphodiesterase 5 inhibitor.

## 2023-04-11 NOTE — REASON FOR VISIT
[FreeTextEntry1] : Returns for follow-up today.  We have been modifying various risk factors especially severe overweight.  The patient has made very good progress over the past year by limiting his consumption of sugar desserts etc.\par \par There have been no acute cardiac symptoms there have been no symptoms of acute chest pain or shortness of breath.  Regular aerobic exercise is limited especially because of knee arthritis.\par \par

## 2023-11-06 ENCOUNTER — RX RENEWAL (OUTPATIENT)
Age: 68
End: 2023-11-06

## 2023-11-06 RX ORDER — PRAVASTATIN SODIUM 20 MG/1
20 TABLET ORAL DAILY
Qty: 90 | Refills: 3 | Status: ACTIVE | COMMUNITY
Start: 2019-12-06 | End: 1900-01-01

## 2024-01-09 ENCOUNTER — APPOINTMENT (OUTPATIENT)
Dept: CARDIOLOGY | Facility: CLINIC | Age: 69
End: 2024-01-09

## 2024-03-14 ENCOUNTER — APPOINTMENT (OUTPATIENT)
Dept: PULMONOLOGY | Facility: CLINIC | Age: 69
End: 2024-03-14
Payer: COMMERCIAL

## 2024-03-14 VITALS
WEIGHT: 283 LBS | DIASTOLIC BLOOD PRESSURE: 70 MMHG | SYSTOLIC BLOOD PRESSURE: 140 MMHG | HEIGHT: 66 IN | BODY MASS INDEX: 45.48 KG/M2

## 2024-03-14 DIAGNOSIS — G47.33 OBSTRUCTIVE SLEEP APNEA (ADULT) (PEDIATRIC): ICD-10-CM

## 2024-03-14 PROCEDURE — 99213 OFFICE O/P EST LOW 20 MIN: CPT

## 2024-03-14 NOTE — ASSESSMENT
[FreeTextEntry1] : 69 year  old man  with sleep apnea is doing well with the CPAP.  Patient is compliant with the CPAP and benefited significantly with the CPAP. Encouraged patient to use more, but due to his work schedule he is not able to use as intended.

## 2024-03-14 NOTE — HISTORY OF PRESENT ILLNESS
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 Patient has severe apnea in 2019 and is on cpap since. download data ahi 0.9 usage 5 hrs pressure 8-12cm He is compliant and benefitted with cpap. He is not sleepy while driving. dme [employer] uses nasal pillows  He works night shift and sleeps only for 4 hrs at home a day.

## 2024-04-11 ENCOUNTER — NON-APPOINTMENT (OUTPATIENT)
Age: 69
End: 2024-04-11

## 2024-04-11 ENCOUNTER — APPOINTMENT (OUTPATIENT)
Dept: HEART AND VASCULAR | Facility: CLINIC | Age: 69
End: 2024-04-11
Payer: COMMERCIAL

## 2024-04-11 DIAGNOSIS — I25.10 ATHEROSCLEROTIC HEART DISEASE OF NATIVE CORONARY ARTERY W/OUT ANGINA PECTORIS: ICD-10-CM

## 2024-04-11 PROCEDURE — 36415 COLL VENOUS BLD VENIPUNCTURE: CPT

## 2024-04-11 PROCEDURE — 99215 OFFICE O/P EST HI 40 MIN: CPT | Mod: 25

## 2024-04-11 PROCEDURE — 93000 ELECTROCARDIOGRAM COMPLETE: CPT

## 2024-04-15 LAB
ALBUMIN SERPL ELPH-MCNC: 4.4 G/DL
ALP BLD-CCNC: 118 U/L
ALT SERPL-CCNC: 46 U/L
ANION GAP SERPL CALC-SCNC: 11 MMOL/L
AST SERPL-CCNC: 28 U/L
BILIRUB SERPL-MCNC: 0.2 MG/DL
BUN SERPL-MCNC: 26 MG/DL
CALCIUM SERPL-MCNC: 9.5 MG/DL
CHLORIDE SERPL-SCNC: 105 MMOL/L
CHOLEST SERPL-MCNC: 184 MG/DL
CO2 SERPL-SCNC: 28 MMOL/L
CREAT SERPL-MCNC: 1.4 MG/DL
EGFR: 54 ML/MIN/1.73M2
GLUCOSE SERPL-MCNC: 143 MG/DL
HDLC SERPL-MCNC: 52 MG/DL
LDLC SERPL CALC-MCNC: 106 MG/DL
NONHDLC SERPL-MCNC: 132 MG/DL
POTASSIUM SERPL-SCNC: 4.3 MMOL/L
PROT SERPL-MCNC: 7 G/DL
SODIUM SERPL-SCNC: 144 MMOL/L
TRIGL SERPL-MCNC: 147 MG/DL

## 2024-04-18 VITALS — SYSTOLIC BLOOD PRESSURE: 150 MMHG | RESPIRATION RATE: 16 BRPM | HEART RATE: 76 BPM | DIASTOLIC BLOOD PRESSURE: 80 MMHG

## 2024-04-18 PROBLEM — I25.10 CORONARY ARTERY ARTERIOSCLEROSIS: Status: ACTIVE | Noted: 2024-04-18

## 2024-04-18 NOTE — DISCUSSION/SUMMARY
[FreeTextEntry1] : EKG: NSR, IRBBB  Impression: will refer for EXSE given that it has been 2 years since his last one in this patient with documented CAD> WIll order lipid profile (blood drawn in office). He is to keep a BP log.  Addendum: , HDL 52, , .

## 2024-10-25 ENCOUNTER — RX RENEWAL (OUTPATIENT)
Age: 69
End: 2024-10-25

## 2025-03-13 ENCOUNTER — APPOINTMENT (OUTPATIENT)
Dept: PULMONOLOGY | Facility: CLINIC | Age: 70
End: 2025-03-13
Payer: MEDICARE

## 2025-03-13 VITALS
HEIGHT: 66 IN | HEART RATE: 60 BPM | DIASTOLIC BLOOD PRESSURE: 70 MMHG | BODY MASS INDEX: 45 KG/M2 | WEIGHT: 280 LBS | SYSTOLIC BLOOD PRESSURE: 125 MMHG

## 2025-03-13 DIAGNOSIS — G47.33 OBSTRUCTIVE SLEEP APNEA (ADULT) (PEDIATRIC): ICD-10-CM

## 2025-03-13 DIAGNOSIS — I10 ESSENTIAL (PRIMARY) HYPERTENSION: ICD-10-CM

## 2025-03-13 PROCEDURE — 99203 OFFICE O/P NEW LOW 30 MIN: CPT

## 2025-03-13 PROCEDURE — 99213 OFFICE O/P EST LOW 20 MIN: CPT

## 2025-04-03 ENCOUNTER — APPOINTMENT (OUTPATIENT)
Dept: HEART AND VASCULAR | Facility: CLINIC | Age: 70
End: 2025-04-03